# Patient Record
Sex: MALE | Race: WHITE | NOT HISPANIC OR LATINO | Employment: FULL TIME | URBAN - METROPOLITAN AREA
[De-identification: names, ages, dates, MRNs, and addresses within clinical notes are randomized per-mention and may not be internally consistent; named-entity substitution may affect disease eponyms.]

---

## 2017-09-11 ENCOUNTER — OFFICE VISIT (OUTPATIENT)
Dept: URGENT CARE | Facility: CLINIC | Age: 27
End: 2017-09-11
Payer: COMMERCIAL

## 2017-09-11 VITALS
TEMPERATURE: 98.5 F | HEART RATE: 88 BPM | BODY MASS INDEX: 27.17 KG/M2 | SYSTOLIC BLOOD PRESSURE: 114 MMHG | OXYGEN SATURATION: 97 % | HEIGHT: 73 IN | RESPIRATION RATE: 14 BRPM | WEIGHT: 205 LBS | DIASTOLIC BLOOD PRESSURE: 70 MMHG

## 2017-09-11 DIAGNOSIS — J98.01 BRONCHOSPASM, ACUTE: ICD-10-CM

## 2017-09-11 DIAGNOSIS — J06.9 VIRAL UPPER RESPIRATORY TRACT INFECTION: ICD-10-CM

## 2017-09-11 PROCEDURE — 99202 OFFICE O/P NEW SF 15 MIN: CPT | Performed by: EMERGENCY MEDICINE

## 2017-09-11 ASSESSMENT — ENCOUNTER SYMPTOMS
WHEEZING: 1
NAUSEA: 0
SWOLLEN GLANDS: 0
RHINORRHEA: 1
SPUTUM PRODUCTION: 0
HEADACHES: 0
VOMITING: 0
SHORTNESS OF BREATH: 0
SORE THROAT: 1
SINUS PAIN: 0
CHILLS: 0
ABDOMINAL PAIN: 0
MYALGIAS: 1
DIARRHEA: 0
DIAPHORESIS: 0
HEMOPTYSIS: 0
COUGH: 1

## 2017-09-11 NOTE — LETTER
September 11, 2017       Patient: Med Munoz   YOB: 1990   Date of Visit: 9/11/2017         To Whom It May Concern:    It is my medical opinion that Med Munoz should not attend work today or tomorrow.          Sincerely,          Vince Sandy M.D.  Electronically Signed

## 2017-09-11 NOTE — PROGRESS NOTES
"Subjective:      Med Munoz is a 27 y.o. male who presents with URI (uri symptoms x 2 days . needs work note .)            URI    This is a new problem. Episode onset: 2 days. The problem has been gradually worsening. There has been no fever. Associated symptoms include congestion, coughing, a plugged ear sensation, rhinorrhea, a sore throat and wheezing. Pertinent negatives include no abdominal pain, chest pain, diarrhea, ear pain, headaches, joint pain, nausea, rash, sinus pain, sneezing, swollen glands or vomiting.   Past medical history of childhood asthma.    Review of Systems   Constitutional: Negative for chills, diaphoresis and malaise/fatigue.   HENT: Positive for congestion, rhinorrhea and sore throat. Negative for ear pain, nosebleeds and sneezing.    Respiratory: Positive for cough and wheezing. Negative for hemoptysis, sputum production and shortness of breath.         Notes spells of persistent coughing.   Cardiovascular: Negative for chest pain.   Gastrointestinal: Negative for abdominal pain, diarrhea, nausea and vomiting.   Musculoskeletal: Positive for myalgias. Negative for joint pain.   Skin: Negative for rash.   Neurological: Negative for headaches.   Endo/Heme/Allergies: Positive for environmental allergies.     PMH:  has no past medical history on file.  MEDS:   Current Outpatient Prescriptions:   •  Albuterol Sulfate 108 (90 Base) MCG/ACT AEROSOL POWDER, BREATH ACTIVATED, Inhale 1-2 Puffs by mouth every 6 hours as needed., Disp: 1 Each, Rfl: 0  •  albuterol 108 (90 BASE) MCG/ACT Aero Soln inhalation aerosol, Inhale 2 Puffs by mouth every 6 hours as needed for Shortness of Breath., Disp: 8.5 g, Rfl: 0  ALLERGIES: No Known Allergies  SURGHX: No past surgical history on file.  SOCHX:    FH: family history is not on file.       Objective:     /70   Pulse 88   Temp 36.9 °C (98.5 °F)   Resp 14   Ht 1.854 m (6' 1\")   Wt 93 kg (205 lb)   SpO2 97%   BMI 27.05 kg/m²      Physical Exam "   Constitutional: He appears well-developed and well-nourished. He is cooperative. He does not appear ill. No distress.   HENT:   Head: Normocephalic.   Right Ear: Tympanic membrane and ear canal normal.   Left Ear: Tympanic membrane and ear canal normal.   Nose: Mucosal edema present. No rhinorrhea. Right sinus exhibits no maxillary sinus tenderness and no frontal sinus tenderness. Left sinus exhibits no maxillary sinus tenderness and no frontal sinus tenderness.   Mouth/Throat: Uvula is midline. No trismus in the jaw. Posterior oropharyngeal erythema present. No oropharyngeal exudate, posterior oropharyngeal edema or tonsillar abscesses.   Eyes: Conjunctivae are normal.   Neck: Trachea normal. Neck supple.   Cardiovascular: Normal rate, regular rhythm and normal heart sounds.    Pulmonary/Chest: Effort normal and breath sounds normal.   Lymphadenopathy:     He has no cervical adenopathy.   Neurological: He is alert.   Skin: Skin is warm and dry.   Psychiatric: His behavior is normal.   Vitals reviewed.              Assessment/Plan:     1. Viral upper respiratory tract infection  Recommended supportive care measures, including rest, increasing oral fluid intake and use of over-the-counter medications for relief of symptoms.    2. Bronchospasm, acute  - Albuterol Sulfate 108 (90 Base) MCG/ACT AEROSOL POWDER, BREATH ACTIVATED; Inhale 1-2 Puffs by mouth every 6 hours as needed.  Dispense: 1 Each; Refill: 0

## 2017-09-11 NOTE — PATIENT INSTRUCTIONS
"Use behind-the-counter pseudoephedrine (Sudafed) oral decongestant as needed; avoid bedtime use.Use over-the-counter oxymetazoline (Afrin) nasal spray as needed for up to 3 days only; follow package instructions for dosing.You may use over-the-counter guaifenesin (Mucinex, Robitussin) as needed.    Upper Respiratory Infection, Adult  Most upper respiratory infections (URIs) are caused by a virus. A URI affects the nose, throat, and upper air passages. The most common type of URI is often called \"the common cold.\"  HOME CARE   · Take medicines only as told by your doctor.  · Gargle warm saltwater or take cough drops to comfort your throat as told by your doctor.  · Use a warm mist humidifier or inhale steam from a shower to increase air moisture. This may make it easier to breathe.  · Drink enough fluid to keep your pee (urine) clear or pale yellow.  · Eat soups and other clear broths.  · Have a healthy diet.  · Rest as needed.  · Go back to work when your fever is gone or your doctor says it is okay.  ¨ You may need to stay home longer to avoid giving your URI to others.  ¨ You can also wear a face mask and wash your hands often to prevent spread of the virus.  · Use your inhaler more if you have asthma.  · Do not use any tobacco products, including cigarettes, chewing tobacco, or electronic cigarettes. If you need help quitting, ask your doctor.  GET HELP IF:  · You are getting worse, not better.  · Your symptoms are not helped by medicine.  · You have chills.  · You are getting more short of breath.  · You have brown or red mucus.  · You have yellow or brown discharge from your nose.  · You have pain in your face, especially when you bend forward.  · You have a fever.  · You have puffy (swollen) neck glands.  · You have pain while swallowing.  · You have white areas in the back of your throat.  GET HELP RIGHT AWAY IF:   · You have very bad or constant:  ¨ Headache.  ¨ Ear pain.  ¨ Pain in your forehead, behind " your eyes, and over your cheekbones (sinus pain).  ¨ Chest pain.  · You have long-lasting (chronic) lung disease and any of the following:  ¨ Wheezing.  ¨ Long-lasting cough.  ¨ Coughing up blood.  ¨ A change in your usual mucus.  · You have a stiff neck.  · You have changes in your:  ¨ Vision.  ¨ Hearing.  ¨ Thinking.  ¨ Mood.  MAKE SURE YOU:   · Understand these instructions.  · Will watch your condition.  · Will get help right away if you are not doing well or get worse.     This information is not intended to replace advice given to you by your health care provider. Make sure you discuss any questions you have with your health care provider.     Document Released: 06/05/2009 Document Revised: 05/03/2016 Document Reviewed: 03/25/2015  [employer] Interactive Patient Education ©2016 [employer]  Bronchospasm, Adult  A bronchospasm is when the tubes that carry air in and out of your lungs (airways) spasm or tighten. During a bronchospasm it is hard to breathe. This is because the airways get smaller. A bronchospasm can be triggered by:  · Allergies. These may be to animals, pollen, food, or mold.  · Infection. This is a common cause of bronchospasm.  · Exercise.  · Irritants. These include pollution, cigarette smoke, strong odors, aerosol sprays, and paint fumes.  · Weather changes.  · Stress.  · Being emotional.  HOME CARE   · Always have a plan for getting help. Know when to call your doctor and local emergency services (911 in the [place] Know where you can get emergency care.  · Only take medicines as told by your doctor.  · If you were prescribed an inhaler or nebulizer machine, ask your doctor how to use it correctly. Always use a spacer with your inhaler if you were given one.  · Stay calm during an attack. Try to relax and breathe more slowly.  · Control your home environment:  ¨ Change your heating and air conditioning filter at least once a month.  ¨ Limit your use of fireplaces and wood stoves.  ¨ Do not   smoke. Do not  allow smoking in your home.  ¨ Avoid perfumes and fragrances.  ¨ Get rid of pests (such as roaches and mice) and their droppings.  ¨ Throw away plants if you see mold on them.  ¨ Keep your house clean and dust free.  ¨ Replace carpet with wood, tile, or vinyl justo. Carpet can trap dander and dust.  ¨ Use allergy-proof pillows, mattress covers, and box spring covers.  ¨ Wash bed sheets and blankets every week in hot water. Dry them in a dryer.  ¨ Use blankets that are made of polyester or cotton.  ¨ Wash hands frequently.  GET HELP IF:  · You have muscle aches.  · You have chest pain.  · The thick spit you spit or cough up (sputum) changes from clear or white to yellow, green, gray, or bloody.  · The thick spit you spit or cough up gets thicker.  · There are problems that may be related to the medicine you are given such as:  ¨ A rash.  ¨ Itching.  ¨ Swelling.  ¨ Trouble breathing.  GET HELP RIGHT AWAY IF:  · You feel you cannot breathe or catch your breath.  · You cannot stop coughing.  · Your treatment is not helping you breathe better.  · You have very bad chest pain.  MAKE SURE YOU:   · Understand these instructions.  · Will watch your condition.  · Will get help right away if you are not doing well or get worse.     This information is not intended to replace advice given to you by your health care provider. Make sure you discuss any questions you have with your health care provider.     Document Released: 10/15/2010 Document Revised: 01/08/2016 Document Reviewed: 06/10/2014  [employer] Interactive Patient Education ©2016 [employer]

## 2017-10-18 ENCOUNTER — OFFICE VISIT (OUTPATIENT)
Dept: URGENT CARE | Facility: CLINIC | Age: 27
End: 2017-10-18
Payer: COMMERCIAL

## 2017-10-18 ENCOUNTER — HOSPITAL ENCOUNTER (OUTPATIENT)
Facility: MEDICAL CENTER | Age: 27
End: 2017-10-18
Attending: EMERGENCY MEDICINE
Payer: COMMERCIAL

## 2017-10-18 ENCOUNTER — HOSPITAL ENCOUNTER (OUTPATIENT)
Dept: LAB | Facility: MEDICAL CENTER | Age: 27
End: 2017-10-18
Attending: EMERGENCY MEDICINE
Payer: COMMERCIAL

## 2017-10-18 VITALS
RESPIRATION RATE: 16 BRPM | WEIGHT: 201 LBS | HEART RATE: 89 BPM | HEIGHT: 73 IN | SYSTOLIC BLOOD PRESSURE: 150 MMHG | TEMPERATURE: 98.1 F | BODY MASS INDEX: 26.64 KG/M2 | OXYGEN SATURATION: 98 % | DIASTOLIC BLOOD PRESSURE: 100 MMHG

## 2017-10-18 DIAGNOSIS — Z20.2 EXPOSURE TO SEXUALLY TRANSMITTED DISEASE (STD): ICD-10-CM

## 2017-10-18 DIAGNOSIS — N48.89 PENILE EROSION: ICD-10-CM

## 2017-10-18 LAB
HIV 1+2 AB+HIV1 P24 AG SERPL QL IA: NON REACTIVE
TREPONEMA PALLIDUM IGG+IGM AB [PRESENCE] IN SERUM OR PLASMA BY IMMUNOASSAY: NON REACTIVE

## 2017-10-18 PROCEDURE — 99213 OFFICE O/P EST LOW 20 MIN: CPT | Performed by: EMERGENCY MEDICINE

## 2017-10-18 PROCEDURE — 87491 CHLMYD TRACH DNA AMP PROBE: CPT

## 2017-10-18 PROCEDURE — 86696 HERPES SIMPLEX TYPE 2 TEST: CPT

## 2017-10-18 PROCEDURE — 87591 N.GONORRHOEAE DNA AMP PROB: CPT

## 2017-10-18 PROCEDURE — 87389 HIV-1 AG W/HIV-1&-2 AB AG IA: CPT

## 2017-10-18 PROCEDURE — 86780 TREPONEMA PALLIDUM: CPT

## 2017-10-18 PROCEDURE — 36415 COLL VENOUS BLD VENIPUNCTURE: CPT

## 2017-10-18 ASSESSMENT — ENCOUNTER SYMPTOMS
SORE THROAT: 0
FEVER: 0
JOINT SWELLING: 0
SWOLLEN GLANDS: 0
ABDOMINAL PAIN: 0

## 2017-10-18 NOTE — PATIENT INSTRUCTIONS
Test results will be available in the next 1-3 days; the clinic will contact you with results.    Sexually Transmitted Disease  A sexually transmitted disease (STD) is a disease or infection often passed to another person during sex. However, STDs can be passed through nonsexual ways. An STD can be passed through:  · Spit (saliva).  · Semen.  · Blood.  · Mucus from the vagina.  · Pee (urine).  HOW CAN I LESSEN MY CHANCES OF GETTING AN STD?  · Use:  ¨ Latex condoms.  ¨ Water-soluble lubricants with condoms. Do not use petroleum jelly or oils.  ¨ Dental dams. These are small pieces of latex that are used as a barrier during oral sex.  · Avoid having more than one sex partner.  · Do not have sex with someone who has other sex partners.  · Do not have sex with anyone you do not know or who is at high risk for an STD.  · Avoid risky sex that can break your skin.  · Do not have sex if you have open sores on your mouth or skin.  · Avoid drinking too much alcohol or taking illegal drugs. Alcohol and drugs can affect your good judgment.  · Avoid oral and anal sex acts.  · Get shots (vaccines) for HPV and hepatitis.  · If you are at risk of being infected with HIV, it is advised that you take a certain medicine daily to prevent HIV infection. This is called pre-exposure prophylaxis (PrEP). You may be at risk if:  ¨ You are a man who has sex with other men (MSM).  ¨ You are attracted to the opposite sex (heterosexual) and are having sex with more than one partner.  ¨ You take drugs with a needle.  ¨ You have sex with someone who has HIV.  · Talk with your doctor about if you are at high risk of being infected with HIV. If you begin to take PrEP, get tested for HIV first. Get tested every 3 months for as long as you are taking PrEP.  · Get tested for STDs every year if you are sexually active. If you are treated for an STD, get tested again 3 months after you are treated.  WHAT SHOULD I DO IF I THINK I HAVE AN STD?  · See your  doctor.  · Tell your sex partner(s) that you have an STD. They should be tested and treated.  · Do not have sex until your doctor says it is okay.  WHEN SHOULD I GET HELP?  Get help right away if:  · You have bad belly (abdominal) pain.  · You are a man and have puffiness (swelling) or pain in your testicles.  · You are a woman and have puffiness in your vagina.     This information is not intended to replace advice given to you by your health care provider. Make sure you discuss any questions you have with your health care provider.     Document Released: 01/25/2006 Document Revised: 01/08/2016 Document Reviewed: 06/13/2014  [employer] Interactive Patient Education ©2016 [employer]

## 2017-10-18 NOTE — LETTER
October 18, 2017       Patient: Med Munoz   YOB: 1990   Date of Visit: 10/18/2017         To Whom It May Concern:    Med Munoz was not able to attend work today for medical reasons.      Sincerely,          Vinec Sandy M.D.  Electronically Signed

## 2017-10-18 NOTE — PROGRESS NOTES
"Subjective:      Med Munoz is a 27 y.o. male who presents with Exposure to STD (x 1 day/ red spot/ wife had STD)            Exposure to STD   This is a new problem. Episode onset: 2 days. The problem has been unchanged. Associated symptoms include a rash. Pertinent negatives include no abdominal pain, fever, joint swelling, sore throat, swollen glands or urinary symptoms. Nothing aggravates the symptoms. He has tried nothing for the symptoms.   Notes  female sexual partner, possibly diagnosed with herpes recently. Noted nontender red spots on right side of penis. No prior known STD.    Review of Systems   Constitutional: Negative for fever.   HENT: Negative for sore throat.    Gastrointestinal: Negative for abdominal pain.   Genitourinary: Negative for dysuria, frequency, hematuria and urgency.   Musculoskeletal: Negative for joint swelling.   Skin: Positive for rash. Negative for itching.     PMH:  has no past medical history on file.  MEDS:   Current Outpatient Prescriptions:   •  albuterol 108 (90 BASE) MCG/ACT Aero Soln inhalation aerosol, Inhale 2 Puffs by mouth every 6 hours as needed for Shortness of Breath., Disp: 8.5 g, Rfl: 0  •  Albuterol Sulfate 108 (90 Base) MCG/ACT AEROSOL POWDER, BREATH ACTIVATED, Inhale 1-2 Puffs by mouth every 6 hours as needed., Disp: 1 Each, Rfl: 0  ALLERGIES: No Known Allergies  SURGHX: No past surgical history on file.  SOCHX:    FH: family history is not on file.       Objective:     /100   Pulse 89   Temp 36.7 °C (98.1 °F)   Resp 16   Ht 1.854 m (6' 1\")   Wt 91.2 kg (201 lb)   SpO2 98%   BMI 26.52 kg/m²      Physical Exam   Constitutional: He appears well-developed and well-nourished. He is cooperative. He does not have a sickly appearance. He does not appear ill. No distress.   Cardiovascular: Normal rate, regular rhythm and normal heart sounds.    Pulmonary/Chest: Effort normal and breath sounds normal.   Abdominal: Soft. He exhibits no distension. " There is no tenderness. There is no CVA tenderness.   Genitourinary: Testes normal and penis normal. Cremasteric reflex is present. Circumcised. No penile erythema or penile tenderness. No discharge found.   Lymphadenopathy: No inguinal adenopathy noted on the right or left side.   Neurological: He is alert.   Skin: Skin is warm and dry.   2 flat pinkish 2-3 mm spots right shaft of penis. No vesicles, no raised or verrucous component noted.   Psychiatric: He has a normal mood and affect.               Assessment/Plan:     1. Exposure to sexually transmitted disease (STD)  - HIV ANTIBODIES; Future  - RPR  - HERPES (HSV) 2, TYPE SPECIFIC  - CHLAMYDIA/GC PCR URINE OR SWAB    2. Penile erosion  May be HPV; advised patient to recheck prn

## 2017-10-19 LAB
C TRACH DNA SPEC QL NAA+PROBE: NEGATIVE
N GONORRHOEA DNA SPEC QL NAA+PROBE: NEGATIVE
SPECIMEN SOURCE: NORMAL

## 2017-10-20 ENCOUNTER — TELEPHONE (OUTPATIENT)
Dept: URGENT CARE | Facility: CLINIC | Age: 27
End: 2017-10-20

## 2017-10-20 LAB — HSV2 GG IGG SER-ACNC: 0.64 IV

## 2017-10-22 ENCOUNTER — TELEPHONE (OUTPATIENT)
Dept: URGENT CARE | Facility: CLINIC | Age: 27
End: 2017-10-22

## 2018-03-15 ENCOUNTER — NON-PROVIDER VISIT (OUTPATIENT)
Dept: URGENT CARE | Facility: PHYSICIAN GROUP | Age: 28
End: 2018-03-15

## 2018-03-15 DIAGNOSIS — Z02.1 PRE-EMPLOYMENT DRUG SCREENING: ICD-10-CM

## 2018-03-15 PROCEDURE — 8279 PR URINE 6 PANEL - SEND TO LAB: Performed by: NURSE PRACTITIONER

## 2018-03-15 PROCEDURE — 80305 DRUG TEST PRSMV DIR OPT OBS: CPT | Performed by: NURSE PRACTITIONER

## 2018-03-16 LAB
AMP AMPHETAMINE: NORMAL
COC COCAINE: NORMAL
INT CON NEG: NORMAL
INT CON POS: NORMAL
MET METHAMPHETAMINES: NORMAL
OPI OPIATES: NORMAL
PCP PHENCYCLIDINE: NORMAL
POC DRUG COMMENT 753798-OCCUPATIONAL HEALTH: POSITIVE
THC: NORMAL

## 2018-03-16 NOTE — PROGRESS NOTES
Med Munoz is a 28 y.o. male here for a non-provider visit for Drug Screen 3/15/18    If abnormal was an in office provider notified today (if so, indicate provider)? No  Routed to PCP? No

## 2018-03-23 ENCOUNTER — NON-PROVIDER VISIT (OUTPATIENT)
Dept: OCCUPATIONAL MEDICINE | Facility: CLINIC | Age: 28
End: 2018-03-23

## 2018-03-23 PROCEDURE — 8911 PR MRO FEE: Performed by: INTERNAL MEDICINE

## 2018-12-12 ENCOUNTER — HOSPITAL ENCOUNTER (EMERGENCY)
Dept: HOSPITAL 8 - ED | Age: 28
Discharge: HOME | End: 2018-12-12
Payer: COMMERCIAL

## 2018-12-12 VITALS — DIASTOLIC BLOOD PRESSURE: 71 MMHG | SYSTOLIC BLOOD PRESSURE: 124 MMHG

## 2018-12-12 VITALS — WEIGHT: 192.68 LBS | BODY MASS INDEX: 25.54 KG/M2 | HEIGHT: 73 IN

## 2018-12-12 DIAGNOSIS — R11.2: Primary | ICD-10-CM

## 2018-12-12 DIAGNOSIS — R19.7: ICD-10-CM

## 2018-12-12 LAB
ALBUMIN SERPL-MCNC: 4.1 G/DL (ref 3.4–5)
ALP SERPL-CCNC: 71 U/L (ref 45–117)
ALT SERPL-CCNC: 36 U/L (ref 12–78)
ANION GAP SERPL CALC-SCNC: 8 MMOL/L (ref 5–15)
BASOPHILS # BLD AUTO: 0.07 X10^3/UL (ref 0–0.1)
BASOPHILS NFR BLD AUTO: 1 % (ref 0–1)
BILIRUB SERPL-MCNC: 0.6 MG/DL (ref 0.2–1)
CALCIUM SERPL-MCNC: 8.8 MG/DL (ref 8.5–10.1)
CHLORIDE SERPL-SCNC: 102 MMOL/L (ref 98–107)
CREAT SERPL-MCNC: 1.07 MG/DL (ref 0.7–1.3)
EOSINOPHIL # BLD AUTO: 0.19 X10^3/UL (ref 0–0.4)
EOSINOPHIL NFR BLD AUTO: 2 % (ref 1–7)
ERYTHROCYTE [DISTWIDTH] IN BLOOD BY AUTOMATED COUNT: 13.8 % (ref 9.4–14.8)
LYMPHOCYTES # BLD AUTO: 0.98 X10^3/UL (ref 1–3.4)
LYMPHOCYTES NFR BLD AUTO: 9 % (ref 22–44)
MCH RBC QN AUTO: 28.4 PG (ref 27.5–34.5)
MCHC RBC AUTO-ENTMCNC: 33.5 G/DL (ref 33.2–36.2)
MCV RBC AUTO: 84.8 FL (ref 81–97)
MD: NO
MONOCYTES # BLD AUTO: 0.74 X10^3/UL (ref 0.2–0.8)
MONOCYTES NFR BLD AUTO: 7 % (ref 2–9)
NEUTROPHILS # BLD AUTO: 9.26 X10^3/UL (ref 1.8–6.8)
NEUTROPHILS NFR BLD AUTO: 82 % (ref 42–75)
PLATELET # BLD AUTO: 250 X10^3/UL (ref 130–400)
PMV BLD AUTO: 9.6 FL (ref 7.4–10.4)
PROT SERPL-MCNC: 7.7 G/DL (ref 6.4–8.2)
RBC # BLD AUTO: 5.83 X10^6/UL (ref 4.38–5.82)

## 2018-12-12 PROCEDURE — 85025 COMPLETE CBC W/AUTO DIFF WBC: CPT

## 2018-12-12 PROCEDURE — 36415 COLL VENOUS BLD VENIPUNCTURE: CPT

## 2018-12-12 PROCEDURE — 99284 EMERGENCY DEPT VISIT MOD MDM: CPT

## 2018-12-12 PROCEDURE — 74021 RADEX ABDOMEN 3+ VIEWS: CPT

## 2018-12-12 PROCEDURE — 80053 COMPREHEN METABOLIC PANEL: CPT

## 2018-12-12 PROCEDURE — 83690 ASSAY OF LIPASE: CPT

## 2019-04-01 ENCOUNTER — HOSPITAL ENCOUNTER (EMERGENCY)
Dept: HOSPITAL 8 - ED | Age: 29
Discharge: HOME | End: 2019-04-01
Payer: COMMERCIAL

## 2019-04-01 VITALS — BODY MASS INDEX: 26.76 KG/M2 | WEIGHT: 201.94 LBS | HEIGHT: 73 IN

## 2019-04-01 VITALS — SYSTOLIC BLOOD PRESSURE: 134 MMHG | DIASTOLIC BLOOD PRESSURE: 85 MMHG

## 2019-04-01 DIAGNOSIS — B34.9: Primary | ICD-10-CM

## 2019-04-01 PROCEDURE — 99284 EMERGENCY DEPT VISIT MOD MDM: CPT

## 2019-04-01 PROCEDURE — 87880 STREP A ASSAY W/OPTIC: CPT

## 2019-04-01 PROCEDURE — 87081 CULTURE SCREEN ONLY: CPT

## 2019-04-01 PROCEDURE — 71046 X-RAY EXAM CHEST 2 VIEWS: CPT

## 2019-04-01 PROCEDURE — 87400 INFLUENZA A/B EACH AG IA: CPT

## 2019-11-30 ENCOUNTER — HOSPITAL ENCOUNTER (EMERGENCY)
Dept: HOSPITAL 8 - ED | Age: 29
Discharge: HOME | End: 2019-11-30
Payer: COMMERCIAL

## 2019-11-30 VITALS — HEIGHT: 72 IN | WEIGHT: 205.03 LBS | BODY MASS INDEX: 27.77 KG/M2

## 2019-11-30 VITALS — DIASTOLIC BLOOD PRESSURE: 85 MMHG | SYSTOLIC BLOOD PRESSURE: 148 MMHG

## 2019-11-30 DIAGNOSIS — N34.1: Primary | ICD-10-CM

## 2019-11-30 LAB
CULTURE INDICATED?: NO
MICROSCOPIC: (no result)

## 2019-11-30 PROCEDURE — 87591 N.GONORRHOEAE DNA AMP PROB: CPT

## 2019-11-30 PROCEDURE — 87491 CHLMYD TRACH DNA AMP PROBE: CPT

## 2019-11-30 PROCEDURE — 96372 THER/PROPH/DIAG INJ SC/IM: CPT

## 2019-11-30 PROCEDURE — 99283 EMERGENCY DEPT VISIT LOW MDM: CPT

## 2019-11-30 PROCEDURE — 81001 URINALYSIS AUTO W/SCOPE: CPT

## 2019-11-30 NOTE — NUR
PT REPORTS WAKING UP THIS AM AND IT BURNING WHILE HE PEED, HE THEN NOTED SOME 
CRUSTINESS AROUND HIS :"VINCENT:" AND DECIDEDED TO COME IN RIGHT AWAY TO BE 
EVALUATED. PT DENIES BLADDER PAIN AND ABD PAIN. "ITS JUST MY UNIT DUDE:"





UA COLLECTED FROM PT. FELIXD IN TO [facility]

## 2020-02-12 ENCOUNTER — HOSPITAL ENCOUNTER (EMERGENCY)
Dept: HOSPITAL 8 - ED | Age: 30
Discharge: HOME | End: 2020-02-12
Payer: COMMERCIAL

## 2020-02-12 VITALS — HEIGHT: 73 IN | BODY MASS INDEX: 26.85 KG/M2 | WEIGHT: 202.6 LBS

## 2020-02-12 VITALS — SYSTOLIC BLOOD PRESSURE: 134 MMHG | DIASTOLIC BLOOD PRESSURE: 80 MMHG

## 2020-02-12 DIAGNOSIS — N34.2: Primary | ICD-10-CM

## 2020-02-12 PROCEDURE — 99283 EMERGENCY DEPT VISIT LOW MDM: CPT

## 2020-02-12 PROCEDURE — 87491 CHLMYD TRACH DNA AMP PROBE: CPT

## 2020-02-12 PROCEDURE — 96372 THER/PROPH/DIAG INJ SC/IM: CPT

## 2020-02-12 PROCEDURE — 87591 N.GONORRHOEAE DNA AMP PROB: CPT

## 2020-02-12 NOTE — NUR
PT HERE TODAY FOR PAINFUL URINATION. RECENTLY HAD UNPROTECTED SEX. RESITNG ON 
[facility]. AWARE OF POC.

## 2020-03-05 ENCOUNTER — HOSPITAL ENCOUNTER (EMERGENCY)
Dept: HOSPITAL 8 - ED | Age: 30
Discharge: HOME | End: 2020-03-05
Payer: COMMERCIAL

## 2020-03-05 VITALS — HEIGHT: 73 IN | BODY MASS INDEX: 26.82 KG/M2 | WEIGHT: 202.38 LBS

## 2020-03-05 VITALS — SYSTOLIC BLOOD PRESSURE: 146 MMHG | DIASTOLIC BLOOD PRESSURE: 90 MMHG

## 2020-03-05 DIAGNOSIS — N50.819: Primary | ICD-10-CM

## 2020-03-05 DIAGNOSIS — Z20.2: ICD-10-CM

## 2020-03-05 PROCEDURE — 99283 EMERGENCY DEPT VISIT LOW MDM: CPT

## 2020-03-05 PROCEDURE — 87491 CHLMYD TRACH DNA AMP PROBE: CPT

## 2020-03-05 PROCEDURE — 87591 N.GONORRHOEAE DNA AMP PROB: CPT

## 2020-03-05 PROCEDURE — 96372 THER/PROPH/DIAG INJ SC/IM: CPT

## 2024-02-05 NOTE — NUR
Patient/Caregiver given discharge instructions and they have confirmed that 
they understand the instructions.  Patient ambulatory with steady gait.
Detail Level: Detailed